# Patient Record
Sex: FEMALE | Race: BLACK OR AFRICAN AMERICAN
[De-identification: names, ages, dates, MRNs, and addresses within clinical notes are randomized per-mention and may not be internally consistent; named-entity substitution may affect disease eponyms.]

---

## 2017-12-10 ENCOUNTER — HOSPITAL ENCOUNTER (INPATIENT)
Dept: HOSPITAL 74 - YASAS | Age: 37
LOS: 4 days | Discharge: HOME | End: 2017-12-14
Attending: INTERNAL MEDICINE | Admitting: INTERNAL MEDICINE
Payer: COMMERCIAL

## 2017-12-10 VITALS — BODY MASS INDEX: 22.3 KG/M2

## 2017-12-10 DIAGNOSIS — F13.10: ICD-10-CM

## 2017-12-10 DIAGNOSIS — F19.24: ICD-10-CM

## 2017-12-10 DIAGNOSIS — F10.230: Primary | ICD-10-CM

## 2017-12-10 DIAGNOSIS — F14.20: ICD-10-CM

## 2017-12-10 DIAGNOSIS — F31.9: ICD-10-CM

## 2017-12-10 LAB
APPEARANCE UR: (no result)
BACTERIA #/AREA URNS HPF: (no result) /HPF
BILIRUB UR STRIP.AUTO-MCNC: NEGATIVE MG/DL
COLOR UR: YELLOW
EPITH CASTS URNS QL MICRO: (no result) /HPF
KETONES UR QL STRIP: NEGATIVE
LEUKOCYTE ESTERASE UR QL STRIP.AUTO: (no result)
MUCOUS THREADS URNS QL MICRO: (no result)
NITRITE UR QL STRIP: NEGATIVE
PH UR: 5 [PH] (ref 5–8)
PROT UR QL STRIP: (no result)
PROT UR QL STRIP: NEGATIVE
RBC # UR STRIP: NEGATIVE /UL
SP GR UR: 1.03 (ref 1–1.03)
UROBILINOGEN UR STRIP-MCNC: NEGATIVE MG/DL (ref 0.2–1)

## 2017-12-10 PROCEDURE — HZ2ZZZZ DETOXIFICATION SERVICES FOR SUBSTANCE ABUSE TREATMENT: ICD-10-PCS | Performed by: INTERNAL MEDICINE

## 2017-12-10 PROCEDURE — G0008 ADMIN INFLUENZA VIRUS VAC: HCPCS

## 2017-12-10 RX ADMIN — ACETAMINOPHEN PRN MG: 325 TABLET ORAL at 22:25

## 2017-12-10 RX ADMIN — NICOTINE SCH: 7 PATCH TRANSDERMAL at 18:42

## 2017-12-10 RX ADMIN — IBUPROFEN PRN MG: 400 TABLET, FILM COATED ORAL at 18:17

## 2017-12-10 RX ADMIN — Medication SCH MG: at 22:24
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
## 2017-12-11 LAB
ALBUMIN SERPL-MCNC: 3.3 G/DL (ref 3.4–5)
ALP SERPL-CCNC: 92 U/L (ref 45–117)
ALT SERPL-CCNC: 42 U/L (ref 12–78)
ANION GAP SERPL CALC-SCNC: 7 MMOL/L (ref 8–16)
AST SERPL-CCNC: 23 U/L (ref 15–37)
BILIRUB SERPL-MCNC: 0.4 MG/DL (ref 0.2–1)
BUN SERPL-MCNC: 11 MG/DL (ref 7–18)
CALCIUM SERPL-MCNC: 8.8 MG/DL (ref 8.5–10.1)
CHLORIDE SERPL-SCNC: 105 MMOL/L (ref 98–107)
CO2 SERPL-SCNC: 27 MMOL/L (ref 21–32)
CREAT SERPL-MCNC: 0.9 MG/DL (ref 0.55–1.02)
DEPRECATED RDW RBC AUTO: 13.8 % (ref 11.6–15.6)
GLUCOSE SERPL-MCNC: 139 MG/DL (ref 74–106)
HCT VFR BLD CALC: 40.8 % (ref 32.4–45.2)
HGB BLD-MCNC: 13.4 GM/DL (ref 10.7–15.3)
MCH RBC QN AUTO: 30.7 PG (ref 25.7–33.7)
MCHC RBC AUTO-ENTMCNC: 32.8 G/DL (ref 32–36)
MCV RBC: 93.4 FL (ref 80–96)
PLATELET # BLD AUTO: 254 K/MM3 (ref 134–434)
PMV BLD: 9.5 FL (ref 7.5–11.1)
POTASSIUM SERPLBLD-SCNC: 4.3 MMOL/L (ref 3.5–5.1)
PROT SERPL-MCNC: 6.7 G/DL (ref 6.4–8.2)
RBC # BLD AUTO: 4.37 M/MM3 (ref 3.6–5.2)
SODIUM SERPL-SCNC: 139 MMOL/L (ref 136–145)
WBC # BLD AUTO: 7.4 K/MM3 (ref 4–10)

## 2017-12-11 RX ADMIN — Medication SCH MG: at 22:31

## 2017-12-11 RX ADMIN — IBUPROFEN PRN MG: 400 TABLET, FILM COATED ORAL at 17:41

## 2017-12-11 RX ADMIN — Medication SCH: at 22:52

## 2017-12-11 RX ADMIN — ACETAMINOPHEN PRN MG: 325 TABLET ORAL at 05:20

## 2017-12-11 RX ADMIN — NICOTINE SCH MG: 7 PATCH TRANSDERMAL at 10:24

## 2017-12-11 RX ADMIN — Medication SCH TAB: at 10:24

## 2017-12-11 RX ADMIN — ACETAMINOPHEN PRN MG: 325 TABLET ORAL at 10:44

## 2017-12-11 NOTE — PN
S CIWA





- CIWA Score


Nausea/Vomitin-No Nausea/No Vomiting


Muscle Tremors: 4-Moderate,w/Arms Extend


Anxiety: 3


Agitation: 3


Paroxysmal Sweats: 3


Orientation: 0-Oriented


Tacttile Disturbances: 0-None


Auditory Disturbances: 0-None


Visual Disturbances: 0-None


Headache: 0-None Present


CIWA-Ar Total Score: 13





BHS Progress Note (SOAP)


Subjective: 





body aches


sweats


irritable 


agitation


shakes


Objective: 





17 11:28


 Vital Signs











Temperature  98.1 F   17 09:56


 


Pulse Rate  89   17 09:56


 


Respiratory Rate  18   17 09:56


 


Blood Pressure  124/56   17 09:56


 


O2 Sat by Pulse Oximetry (%)      








 Laboratory Tests











  12/10/17 12/11/17 12/11/17





  18:00 07:00 07:00


 


WBC   7.4 


 


RBC   4.37 


 


Hgb   13.4 


 


Hct   40.8 


 


MCV   93.4 


 


MCH   30.7 


 


MCHC   32.8 


 


RDW   13.8 


 


Plt Count   254 


 


MPV   9.5 


 


Sodium    139


 


Potassium    4.3


 


Chloride    105


 


Carbon Dioxide    27


 


Anion Gap    7 L


 


BUN    11


 


Creatinine    0.9


 


Creat Clearance w eGFR    > 60


 


Random Glucose    139 H


 


Calcium    8.8


 


Total Bilirubin    0.4


 


AST    23


 


ALT    42


 


Alkaline Phosphatase    92


 


Total Protein    6.7


 


Albumin    3.3 L


 


Urine Color  Yellow  


 


Urine Appearance  Cloudy  


 


Urine pH  5.0  


 


Ur Specific Gravity  1.026  


 


Urine Protein  Negative  


 


Urine Glucose (UA)  1+ H  


 


Urine Ketones  Negative  


 


Urine Blood  Negative  


 


Urine Nitrite  Negative  


 


Urine Bilirubin  Negative  


 


Urine Urobilinogen  Negative  


 


Ur Leukocyte Esterase  Negative  


 


Urine WBC (Auto)  5  


 


Urine RBC (Auto)  2  


 


Ur Epithelial Cells  Many  


 


Urine Bacteria  Few  


 


Urine Mucus  Few  








aaox3


ambulating


no acute distress


Assessment: 





17 11:29


withdrawal sx


Plan: 





continue detox


increase fluids


lidocaine patch


motrin/tylenol prn

## 2017-12-11 NOTE — EKG
Test Reason : 

Blood Pressure : ***/*** mmHG

Vent. Rate : 085 BPM     Atrial Rate : 085 BPM

   P-R Int : 126 ms          QRS Dur : 084 ms

    QT Int : 368 ms       P-R-T Axes : 078 064 054 degrees

   QTc Int : 437 ms

 

SINUS RHYTHM WITH OCCASIONAL PREMATURE VENTRICULAR COMPLEXES

OTHERWISE NORMAL ECG

NO PREVIOUS ECGS AVAILABLE

Confirmed by MIRZA DA SILVA, KAMARI ([id number]) on 12/11/2017 3:13:41 PM

 

Referred By:             Confirmed By:KAMARI BLUE MD

## 2017-12-11 NOTE — CONSULT
[facility] Psychiatric Consult





- Data


Date of interview: 12/11/17


Admission source: [facility]


Identifying data: This is 37 years old female with history of Bipolar Disorder, 

history of Psychiatric hospitalizations, intoxicated with:  Alcohol, Cocaine 

and Benzodiazepins


Substance Abuse History: Substances Abused.  ** Alcohol.  Route: Oral.  

Frequency: Daily.  Amount used: Vodka  1 pint.  Age of first use: 13.  Date of 

Last Use: 12/09/17.  ** Crack.  Route: Smoking.  Frequency: Daily.  Amount used

: $100.00.  Age of first use: 20.  Date of Last Use: 12/09/17


Medical History: Denies significant medical issues


Psychiatric History: Patoent rteports history of Bipolar disorder, history of 

psychiatric jhospoitalizations with most brecent time on [phone] at at [facility] fopr safety. Patioent reports being on Trileptal in the past, 

currently is no taking any psychiatric medicatoins prior to admission


Physical/Sexual Abuse/Trauma History: Denies


Additional Comment: Observation.  Detox Unit Care Protocol





Mental Status Exam





- Mental Status Exam


Alert and Oriented to: Person


Cognitive Function: Fair


Patient Appearance: Well Groomed


Mood: Apprehensive


Affect: Mood Congruent


Patient Behavior: Cooperative


Speech Pattern: Appropriate


Voice Loudness: Normal


Thought Process: Circumstantial, Goal Oriented


Thought Disorder: Being Controlled


Hallucinations: Denies


Suicidal Ideation: Denies


Homicidal Ideation: Denies


Insight/Judgement: Fair


Sleep: Difficulty falling asleep


Appetite: Fair


Muscle strength/Tone: Normal


Gait/Station: Normal


Additional Comments: Observation.  Detox Unit Care Protocol





Psychiatric Findings





- Problem List (Axis 1, 2,3)


(1) Bipolar disorder


Current Visit: Yes   Status: Suspected   





(2) Benzodiazepine abuse


Current Visit: Yes   Status: Acute   





(3) Drug-induced mood disorder


Current Visit: Yes   Status: Acute   





(4) Alcohol dependence with uncomplicated withdrawal


Current Visit: Yes   Status: Acute   





(5) Cocaine dependence, uncomplicated


Current Visit: Yes   Status: Acute   





- Initial Treatment Plan


Initial Treatment Plan: Observation.  Detox Unit Care Protocol

## 2017-12-12 RX ADMIN — NICOTINE SCH MG: 7 PATCH TRANSDERMAL at 10:54

## 2017-12-12 RX ADMIN — Medication SCH TAB: at 10:52

## 2017-12-12 RX ADMIN — Medication SCH MG: at 22:34

## 2017-12-12 RX ADMIN — IBUPROFEN PRN MG: 400 TABLET, FILM COATED ORAL at 22:34

## 2017-12-12 RX ADMIN — ANALGESIC BALM SCH APPLIC: 1.74; 4.06 OINTMENT TOPICAL at 10:52

## 2017-12-12 RX ADMIN — Medication SCH EACH: at 22:37

## 2017-12-12 RX ADMIN — LIDOCAINE SCH PATCH: 50 PATCH TOPICAL at 10:55

## 2017-12-12 NOTE — PN
[facility] CIWA





- CIWA Score


Nausea/Vomitin-No Nausea/No Vomiting


Muscle Tremors: 3


Anxiety: 2


Agitation: 3


Paroxysmal Sweats: 3


Orientation: 0-Oriented


Tacttile Disturbances: 0-None


Auditory Disturbances: 0-None


Visual Disturbances: 0-None


Headache: 0-None Present


CIWA-Ar Total Score: 11





BHS Progress Note (SOAP)


Subjective: 





agitation


restless


sweats





Objective: 





17 11:04


 Vital Signs











Temperature  98.1 F   17 09:51


 


Pulse Rate  85   17 09:51


 


Respiratory Rate  18   17 09:51


 


Blood Pressure  136/90   17 09:51


 


O2 Sat by Pulse Oximetry (%)      








 Laboratory Tests











  12/10/17 12/11/17 12/11/17





  18:00 07:00 07:00


 


WBC   7.4 


 


RBC   4.37 


 


Hgb   13.4 


 


Hct   40.8 


 


MCV   93.4 


 


MCH   30.7 


 


MCHC   32.8 


 


RDW   13.8 


 


Plt Count   254 


 


MPV   9.5 


 


Sodium    139


 


Potassium    4.3


 


Chloride    105


 


Carbon Dioxide    27


 


Anion Gap    7 L


 


BUN    11


 


Creatinine    0.9


 


Creat Clearance w eGFR    > 60


 


Random Glucose    139 H


 


Calcium    8.8


 


Total Bilirubin    0.4


 


AST    23


 


ALT    42


 


Alkaline Phosphatase    92


 


Total Protein    6.7


 


Albumin    3.3 L


 


Urine Color  Yellow  


 


Urine Appearance  Cloudy  


 


Urine pH  5.0  


 


Ur Specific Gravity  1.026  


 


Urine Protein  Negative  


 


Urine Glucose (UA)  1+ H  


 


Urine Ketones  Negative  


 


Urine Blood  Negative  


 


Urine Nitrite  Negative  


 


Urine Bilirubin  Negative  


 


Urine Urobilinogen  Negative  


 


Ur Leukocyte Esterase  Negative  


 


Urine WBC (Auto)  5  


 


Urine RBC (Auto)  2  


 


Ur Epithelial Cells  Many  


 


Urine Bacteria  Few  


 


Urine Mucus  Few  


 


RPR Titer   


 


HIV 1&2 Antibody Screen   


 


HIV P24 Antigen   














  17





  07:00 07:00


 


WBC  


 


RBC  


 


Hgb  


 


Hct  


 


MCV  


 


MCH  


 


MCHC  


 


RDW  


 


Plt Count  


 


MPV  


 


Sodium  


 


Potassium  


 


Chloride  


 


Carbon Dioxide  


 


Anion Gap  


 


BUN  


 


Creatinine  


 


Creat Clearance w eGFR  


 


Random Glucose  


 


Calcium  


 


Total Bilirubin  


 


AST  


 


ALT  


 


Alkaline Phosphatase  


 


Total Protein  


 


Albumin  


 


Urine Color  


 


Urine Appearance  


 


Urine pH  


 


Ur Specific Gravity  


 


Urine Protein  


 


Urine Glucose (UA)  


 


Urine Ketones  


 


Urine Blood  


 


Urine Nitrite  


 


Urine Bilirubin  


 


Urine Urobilinogen  


 


Ur Leukocyte Esterase  


 


Urine WBC (Auto)  


 


Urine RBC (Auto)  


 


Ur Epithelial Cells  


 


Urine Bacteria  


 


Urine Mucus  


 


RPR Titer  Nonreactive 


 


HIV 1&2 Antibody Screen   Negative


 


HIV P24 Antigen   Negative








aaox3


ambulating


no acute distress


Assessment: 





17 11:05


withdrawal sx


Plan: 





continue detox


increase fluids

## 2017-12-13 RX ADMIN — ANALGESIC BALM SCH APPLIC: 1.74; 4.06 OINTMENT TOPICAL at 10:47

## 2017-12-13 RX ADMIN — Medication SCH MG: at 22:48

## 2017-12-13 RX ADMIN — LIDOCAINE SCH PATCH: 50 PATCH TOPICAL at 10:50

## 2017-12-13 RX ADMIN — Medication SCH TAB: at 10:48

## 2017-12-13 RX ADMIN — IBUPROFEN PRN MG: 400 TABLET, FILM COATED ORAL at 06:16

## 2017-12-13 RX ADMIN — Medication SCH EACH: at 22:48

## 2017-12-13 RX ADMIN — NICOTINE SCH MG: 7 PATCH TRANSDERMAL at 10:50
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
## 2017-12-14 VITALS — TEMPERATURE: 98 F | HEART RATE: 90 BPM | SYSTOLIC BLOOD PRESSURE: 119 MMHG | DIASTOLIC BLOOD PRESSURE: 51 MMHG

## 2017-12-14 RX ADMIN — NICOTINE SCH MG: 7 PATCH TRANSDERMAL at 10:39

## 2017-12-14 RX ADMIN — LIDOCAINE SCH PATCH: 50 PATCH TOPICAL at 10:40

## 2017-12-14 RX ADMIN — Medication SCH TAB: at 10:38

## 2017-12-14 RX ADMIN — ANALGESIC BALM SCH APPLIC: 1.74; 4.06 OINTMENT TOPICAL at 10:39

## 2017-12-14 NOTE — DS
[facility] Detox Discharge Summary


Admission Date: 


12/10/17





Discharge Date: 12/14/17





- History


Present History: Alcohol Dependence, Cocaine Dependence


Additional Comments: 





medically stabel for discharge today, may stay until snow stopa nd medication 

finishses


Pertinent Past History: 





anxiety, depression, insomnia, nicotine dependence, benzodiazepine abuse





- Physical Exam Results


Vital Signs: 


 Vital Signs











Temperature  97.7 F   12/14/17 06:25


 


Pulse Rate  77   12/14/17 06:25


 


Respiratory Rate  18   12/14/17 06:25


 


Blood Pressure  112/67   12/14/17 06:25


 


O2 Sat by Pulse Oximetry (%)      








 Laboratory Tests











  12/10/17 12/11/17 12/11/17





  18:00 07:00 07:00


 


WBC   7.4 


 


RBC   4.37 


 


Hgb   13.4 


 


Hct   40.8 


 


MCV   93.4 


 


MCH   30.7 


 


MCHC   32.8 


 


RDW   13.8 


 


Plt Count   254 


 


MPV   9.5 


 


Sodium    139


 


Potassium    4.3


 


Chloride    105


 


Carbon Dioxide    27


 


Anion Gap    7 L


 


BUN    11


 


Creatinine    0.9


 


Creat Clearance w eGFR    > 60


 


Random Glucose    139 H


 


Calcium    8.8


 


Total Bilirubin    0.4


 


AST    23


 


ALT    42


 


Alkaline Phosphatase    92


 


Total Protein    6.7


 


Albumin    3.3 L


 


Urine Color  Yellow  


 


Urine Appearance  Cloudy  


 


Urine pH  5.0  


 


Ur Specific Gravity  1.026  


 


Urine Protein  Negative  


 


Urine Glucose (UA)  1+ H  


 


Urine Ketones  Negative  


 


Urine Blood  Negative  


 


Urine Nitrite  Negative  


 


Urine Bilirubin  Negative  


 


Urine Urobilinogen  Negative  


 


Ur Leukocyte Esterase  Negative  


 


Urine WBC (Auto)  5  


 


Urine RBC (Auto)  2  


 


Ur Epithelial Cells  Many  


 


Urine Bacteria  Few  


 


Urine Mucus  Few  


 


RPR Titer   


 


HIV 1&2 Antibody Screen   


 


HIV P24 Antigen   














  12/11/17 12/11/17





  07:00 07:00


 


WBC  


 


RBC  


 


Hgb  


 


Hct  


 


MCV  


 


MCH  


 


MCHC  


 


RDW  


 


Plt Count  


 


MPV  


 


Sodium  


 


Potassium  


 


Chloride  


 


Carbon Dioxide  


 


Anion Gap  


 


BUN  


 


Creatinine  


 


Creat Clearance w eGFR  


 


Random Glucose  


 


Calcium  


 


Total Bilirubin  


 


AST  


 


ALT  


 


Alkaline Phosphatase  


 


Total Protein  


 


Albumin  


 


Urine Color  


 


Urine Appearance  


 


Urine pH  


 


Ur Specific Gravity  


 


Urine Protein  


 


Urine Glucose (UA)  


 


Urine Ketones  


 


Urine Blood  


 


Urine Nitrite  


 


Urine Bilirubin  


 


Urine Urobilinogen  


 


Ur Leukocyte Esterase  


 


Urine WBC (Auto)  


 


Urine RBC (Auto)  


 


Ur Epithelial Cells  


 


Urine Bacteria  


 


Urine Mucus  


 


RPR Titer  Nonreactive 


 


HIV 1&2 Antibody Screen   Negative


 


HIV P24 Antigen   Negative








elevated glucose, low al - labs reviewed with patient - to follow up with PCP 

when discharged fro medical care and to repeat labwork, d/c illicit and alcohol 

use. 


Pertinent Admission Physical Exam Findings: 





withdrawal sx





- Treatment


Hospital Course: Detox Protocol Followed, Detoxed Safely, Responded well, 

Discharged Condition Good, Rehab Referral Accepted


Patient has Accepted a Rehab Referral to: yes





- Medication


Discharge Medications: 


Ambulatory Orders





NK [No Known Home Medication]  12/10/17 











- Diagnosis


(1) Drug-induced mood disorder


Current Visit: Yes   Status: Acute   





(2) Alcohol dependence with uncomplicated withdrawal


Current Visit: Yes   Status: Chronic   





(3) Benzodiazepine abuse


Current Visit: Yes   Status: Chronic   





(4) Cocaine dependence, uncomplicated


Current Visit: Yes   Status: Chronic   





(5) Bipolar disorder


Current Visit: Yes   Status: Suspected   





- AMA


Did Patient Leave Against Medical Advice: No

## 2017-12-14 NOTE — PN
[facility] Progress Note


Note: 





rib xray neg for fx but slight abnomrality - patient given copy of xary and 

informed to follow up with PCP when discharged, most liekly pleural thickening.

## 2021-05-28 ENCOUNTER — HOSPITAL ENCOUNTER (INPATIENT)
Dept: HOSPITAL 74 - YASAS | Age: 41
LOS: 5 days | Discharge: OTHER INSTITUTION | End: 2021-06-02
Attending: ALLERGY & IMMUNOLOGY | Admitting: ALLERGY & IMMUNOLOGY
Payer: COMMERCIAL

## 2021-05-28 VITALS — BODY MASS INDEX: 20.2 KG/M2

## 2021-05-28 DIAGNOSIS — F19.24: ICD-10-CM

## 2021-05-28 DIAGNOSIS — F17.210: ICD-10-CM

## 2021-05-28 DIAGNOSIS — F14.20: ICD-10-CM

## 2021-05-28 DIAGNOSIS — F31.9: ICD-10-CM

## 2021-05-28 DIAGNOSIS — F10.230: Primary | ICD-10-CM

## 2021-05-28 DIAGNOSIS — Z59.0: ICD-10-CM

## 2021-05-28 DIAGNOSIS — F13.20: ICD-10-CM

## 2021-05-28 PROCEDURE — HZ2ZZZZ DETOXIFICATION SERVICES FOR SUBSTANCE ABUSE TREATMENT: ICD-10-PCS | Performed by: ALLERGY & IMMUNOLOGY

## 2021-05-28 PROCEDURE — C9803 HOPD COVID-19 SPEC COLLECT: HCPCS

## 2021-05-28 PROCEDURE — U0003 INFECTIOUS AGENT DETECTION BY NUCLEIC ACID (DNA OR RNA); SEVERE ACUTE RESPIRATORY SYNDROME CORONAVIRUS 2 (SARS-COV-2) (CORONAVIRUS DISEASE [COVID-19]), AMPLIFIED PROBE TECHNIQUE, MAKING USE OF HIGH THROUGHPUT TECHNOLOGIES AS DESCRIBED BY CMS-2020-01-R: HCPCS

## 2021-05-28 PROCEDURE — U0005 INFEC AGEN DETEC AMPLI PROBE: HCPCS

## 2021-05-28 SDOH — ECONOMIC STABILITY - HOUSING INSECURITY: HOMELESSNESS: Z59.0

## 2021-05-29 LAB
ALBUMIN SERPL-MCNC: 2.9 G/DL (ref 3.4–5)
ALP SERPL-CCNC: 144 U/L (ref 45–117)
ALT SERPL-CCNC: 56 U/L (ref 13–61)
ANION GAP SERPL CALC-SCNC: 5 MMOL/L (ref 8–16)
AST SERPL-CCNC: 57 U/L (ref 15–37)
BILIRUB SERPL-MCNC: 0.5 MG/DL (ref 0.2–1)
BUN SERPL-MCNC: 8.2 MG/DL (ref 7–18)
CALCIUM SERPL-MCNC: 8.2 MG/DL (ref 8.5–10.1)
CHLORIDE SERPL-SCNC: 102 MMOL/L (ref 98–107)
CO2 SERPL-SCNC: 33 MMOL/L (ref 21–32)
CREAT SERPL-MCNC: 0.9 MG/DL (ref 0.55–1.3)
DEPRECATED RDW RBC AUTO: 14.2 % (ref 11.6–15.6)
GLUCOSE SERPL-MCNC: 102 MG/DL (ref 74–106)
HCT VFR BLD CALC: 36.5 % (ref 32.4–45.2)
HGB BLD-MCNC: 12.4 GM/DL (ref 10.7–15.3)
MCH RBC QN AUTO: 32.9 PG (ref 25.7–33.7)
MCHC RBC AUTO-ENTMCNC: 33.9 G/DL (ref 32–36)
MCV RBC: 97 FL (ref 80–96)
PLATELET # BLD AUTO: 207 K/MM3 (ref 134–434)
PMV BLD: 9 FL (ref 7.5–11.1)
PROT SERPL-MCNC: 6.3 G/DL (ref 6.4–8.2)
RBC # BLD AUTO: 3.77 M/MM3 (ref 3.6–5.2)
SODIUM SERPL-SCNC: 140 MMOL/L (ref 136–145)
WBC # BLD AUTO: 5.1 K/MM3 (ref 4–10)

## 2021-05-29 RX ADMIN — HYDROXYZINE PAMOATE SCH MG: 25 CAPSULE ORAL at 18:19

## 2021-05-29 RX ADMIN — NICOTINE SCH: 14 PATCH, EXTENDED RELEASE TRANSDERMAL at 10:32

## 2021-05-29 RX ADMIN — HYDROXYZINE PAMOATE SCH: 25 CAPSULE ORAL at 07:49

## 2021-05-29 RX ADMIN — HYDROXYZINE PAMOATE SCH: 25 CAPSULE ORAL at 10:32

## 2021-05-29 RX ADMIN — HYDROXYZINE PAMOATE SCH: 25 CAPSULE ORAL at 15:30

## 2021-05-29 RX ADMIN — Medication SCH MG: at 22:36

## 2021-05-29 RX ADMIN — HYDROXYZINE PAMOATE SCH MG: 25 CAPSULE ORAL at 22:36

## 2021-05-29 RX ADMIN — Medication SCH TAB: at 10:32

## 2021-05-30 RX ADMIN — HYDROXYZINE PAMOATE SCH: 25 CAPSULE ORAL at 07:01

## 2021-05-30 RX ADMIN — NICOTINE SCH: 14 PATCH, EXTENDED RELEASE TRANSDERMAL at 10:49

## 2021-05-30 RX ADMIN — HYDROXYZINE PAMOATE SCH: 25 CAPSULE ORAL at 15:57

## 2021-05-30 RX ADMIN — HYDROXYZINE PAMOATE SCH MG: 25 CAPSULE ORAL at 10:48

## 2021-05-30 RX ADMIN — Medication SCH MG: at 22:42

## 2021-05-30 RX ADMIN — Medication SCH TAB: at 10:49

## 2021-05-30 RX ADMIN — HYDROXYZINE PAMOATE SCH MG: 25 CAPSULE ORAL at 22:41

## 2021-05-30 RX ADMIN — Medication SCH MG: at 22:41

## 2021-05-30 RX ADMIN — HYDROXYZINE PAMOATE SCH: 25 CAPSULE ORAL at 18:16

## 2021-05-31 RX ADMIN — HYDROXYZINE PAMOATE SCH: 25 CAPSULE ORAL at 11:14

## 2021-05-31 RX ADMIN — NICOTINE SCH: 14 PATCH, EXTENDED RELEASE TRANSDERMAL at 11:13

## 2021-05-31 RX ADMIN — HYDROXYZINE PAMOATE SCH: 25 CAPSULE ORAL at 18:04

## 2021-05-31 RX ADMIN — HYDROXYZINE PAMOATE SCH: 25 CAPSULE ORAL at 13:03

## 2021-05-31 RX ADMIN — Medication SCH: at 11:14

## 2021-05-31 RX ADMIN — Medication SCH MG: at 22:51

## 2021-05-31 RX ADMIN — HYDROXYZINE PAMOATE SCH: 25 CAPSULE ORAL at 06:29

## 2021-05-31 RX ADMIN — HYDROXYZINE PAMOATE SCH MG: 25 CAPSULE ORAL at 22:50

## 2021-06-01 RX ADMIN — HYDROXYZINE PAMOATE SCH: 25 CAPSULE ORAL at 23:09

## 2021-06-01 RX ADMIN — HYDROXYZINE PAMOATE SCH: 25 CAPSULE ORAL at 06:24

## 2021-06-01 RX ADMIN — HYDROXYZINE PAMOATE SCH MG: 25 CAPSULE ORAL at 17:53

## 2021-06-01 RX ADMIN — Medication SCH: at 23:09

## 2021-06-01 RX ADMIN — NICOTINE SCH: 14 PATCH, EXTENDED RELEASE TRANSDERMAL at 09:37

## 2021-06-01 RX ADMIN — Medication SCH TAB: at 09:39

## 2021-06-01 RX ADMIN — HYDROXYZINE PAMOATE SCH: 25 CAPSULE ORAL at 14:06

## 2021-06-01 RX ADMIN — HYDROXYZINE PAMOATE SCH: 25 CAPSULE ORAL at 09:37

## 2021-06-02 ENCOUNTER — HOSPITAL ENCOUNTER (INPATIENT)
Dept: HOSPITAL 74 - YASAS | Age: 41
LOS: 13 days | Discharge: HOME | DRG: 772 | End: 2021-06-15
Attending: ALLERGY & IMMUNOLOGY | Admitting: ALLERGY & IMMUNOLOGY
Payer: COMMERCIAL

## 2021-06-02 VITALS — SYSTOLIC BLOOD PRESSURE: 101 MMHG | DIASTOLIC BLOOD PRESSURE: 41 MMHG | HEART RATE: 79 BPM | TEMPERATURE: 96.9 F

## 2021-06-02 DIAGNOSIS — Z62.810: ICD-10-CM

## 2021-06-02 DIAGNOSIS — F10.20: Primary | ICD-10-CM

## 2021-06-02 DIAGNOSIS — F31.9: ICD-10-CM

## 2021-06-02 DIAGNOSIS — F14.20: ICD-10-CM

## 2021-06-02 DIAGNOSIS — F39: ICD-10-CM

## 2021-06-02 DIAGNOSIS — Z59.0: ICD-10-CM

## 2021-06-02 DIAGNOSIS — F17.210: ICD-10-CM

## 2021-06-02 DIAGNOSIS — F13.20: ICD-10-CM

## 2021-06-02 DIAGNOSIS — F19.24: ICD-10-CM

## 2021-06-02 PROCEDURE — U0005 INFEC AGEN DETEC AMPLI PROBE: HCPCS

## 2021-06-02 PROCEDURE — U0003 INFECTIOUS AGENT DETECTION BY NUCLEIC ACID (DNA OR RNA); SEVERE ACUTE RESPIRATORY SYNDROME CORONAVIRUS 2 (SARS-COV-2) (CORONAVIRUS DISEASE [COVID-19]), AMPLIFIED PROBE TECHNIQUE, MAKING USE OF HIGH THROUGHPUT TECHNOLOGIES AS DESCRIBED BY CMS-2020-01-R: HCPCS

## 2021-06-02 PROCEDURE — C9803 HOPD COVID-19 SPEC COLLECT: HCPCS

## 2021-06-02 PROCEDURE — HZ42ZZZ GROUP COUNSELING FOR SUBSTANCE ABUSE TREATMENT, COGNITIVE-BEHAVIORAL: ICD-10-PCS | Performed by: ALLERGY & IMMUNOLOGY

## 2021-06-02 RX ADMIN — HYDROXYZINE PAMOATE SCH: 25 CAPSULE ORAL at 09:54

## 2021-06-02 RX ADMIN — NICOTINE SCH MG: 14 PATCH, EXTENDED RELEASE TRANSDERMAL at 09:54

## 2021-06-02 RX ADMIN — Medication SCH: at 09:54

## 2021-06-02 RX ADMIN — HYDROXYZINE PAMOATE SCH: 25 CAPSULE ORAL at 07:25

## 2021-06-02 RX ADMIN — Medication SCH MG: at 21:27

## 2021-06-02 RX ADMIN — Medication SCH: at 15:04

## 2021-06-02 RX ADMIN — NICOTINE SCH: 21 PATCH TRANSDERMAL at 15:04

## 2021-06-02 SDOH — ECONOMIC STABILITY - HOUSING INSECURITY: HOMELESSNESS: Z59.0

## 2021-06-03 RX ADMIN — Medication SCH TAB: at 10:38

## 2021-06-03 RX ADMIN — Medication SCH MG: at 21:15

## 2021-06-03 RX ADMIN — OXCARBAZEPINE SCH MG: 150 TABLET, FILM COATED ORAL at 21:15

## 2021-06-03 RX ADMIN — HYDROXYZINE PAMOATE PRN MG: 25 CAPSULE ORAL at 21:15

## 2021-06-03 RX ADMIN — NICOTINE SCH MG: 21 PATCH TRANSDERMAL at 10:38

## 2021-06-04 RX ADMIN — OXCARBAZEPINE SCH MG: 150 TABLET, FILM COATED ORAL at 10:10

## 2021-06-04 RX ADMIN — Medication SCH MG: at 21:41

## 2021-06-04 RX ADMIN — NICOTINE SCH MG: 21 PATCH TRANSDERMAL at 10:10

## 2021-06-04 RX ADMIN — HYDROXYZINE PAMOATE PRN MG: 25 CAPSULE ORAL at 19:25

## 2021-06-04 RX ADMIN — OXCARBAZEPINE SCH MG: 150 TABLET, FILM COATED ORAL at 21:41

## 2021-06-04 RX ADMIN — Medication SCH TAB: at 10:10

## 2021-06-05 RX ADMIN — Medication SCH TAB: at 10:24

## 2021-06-05 RX ADMIN — NICOTINE SCH MG: 21 PATCH TRANSDERMAL at 10:23

## 2021-06-05 RX ADMIN — Medication SCH MG: at 21:19

## 2021-06-05 RX ADMIN — OXCARBAZEPINE SCH MG: 150 TABLET, FILM COATED ORAL at 21:19

## 2021-06-05 RX ADMIN — OXCARBAZEPINE SCH MG: 150 TABLET, FILM COATED ORAL at 10:24

## 2021-06-06 RX ADMIN — OXCARBAZEPINE SCH MG: 150 TABLET, FILM COATED ORAL at 21:33

## 2021-06-06 RX ADMIN — Medication SCH MG: at 21:33

## 2021-06-06 RX ADMIN — OXCARBAZEPINE SCH MG: 150 TABLET, FILM COATED ORAL at 09:29

## 2021-06-06 RX ADMIN — Medication SCH MG: at 21:34

## 2021-06-06 RX ADMIN — Medication SCH TAB: at 09:28

## 2021-06-06 RX ADMIN — NICOTINE SCH MG: 21 PATCH TRANSDERMAL at 09:28

## 2021-06-07 RX ADMIN — Medication SCH MG: at 21:16

## 2021-06-07 RX ADMIN — OXCARBAZEPINE SCH MG: 150 TABLET, FILM COATED ORAL at 21:17

## 2021-06-07 RX ADMIN — OXCARBAZEPINE SCH MG: 150 TABLET, FILM COATED ORAL at 10:13

## 2021-06-07 RX ADMIN — Medication SCH TAB: at 10:13

## 2021-06-07 RX ADMIN — NICOTINE SCH MG: 21 PATCH TRANSDERMAL at 10:13

## 2021-06-08 RX ADMIN — Medication SCH TAB: at 09:55

## 2021-06-08 RX ADMIN — OXCARBAZEPINE SCH MG: 150 TABLET, FILM COATED ORAL at 21:28

## 2021-06-08 RX ADMIN — Medication SCH MG: at 21:28

## 2021-06-08 RX ADMIN — NICOTINE SCH MG: 21 PATCH TRANSDERMAL at 09:55

## 2021-06-08 RX ADMIN — OXCARBAZEPINE SCH MG: 150 TABLET, FILM COATED ORAL at 09:55

## 2021-06-09 LAB
APPEARANCE UR: CLEAR
BILIRUB UR STRIP.AUTO-MCNC: NEGATIVE MG/DL
COLOR UR: YELLOW
KETONES UR QL STRIP: NEGATIVE
LEUKOCYTE ESTERASE UR QL STRIP.AUTO: NEGATIVE
NITRITE UR QL STRIP: NEGATIVE
PH UR: 6 [PH] (ref 5–8)
PROT UR QL STRIP: NEGATIVE
PROT UR QL STRIP: NEGATIVE
SP GR UR: 1.02 (ref 1.01–1.03)
UROBILINOGEN UR STRIP-MCNC: 0.2 MG/DL (ref 0.2–1)

## 2021-06-09 RX ADMIN — OXCARBAZEPINE SCH MG: 150 TABLET, FILM COATED ORAL at 10:33

## 2021-06-09 RX ADMIN — OXCARBAZEPINE SCH MG: 150 TABLET, FILM COATED ORAL at 21:21

## 2021-06-09 RX ADMIN — Medication SCH MG: at 21:21

## 2021-06-09 RX ADMIN — NICOTINE SCH MG: 21 PATCH TRANSDERMAL at 10:33

## 2021-06-09 RX ADMIN — Medication SCH TAB: at 10:33

## 2021-06-10 LAB — HIV 1+2 AB+HIV1 P24 AG SERPL QL IA: NEGATIVE

## 2021-06-10 RX ADMIN — Medication SCH MG: at 21:36

## 2021-06-10 RX ADMIN — Medication SCH TAB: at 10:47

## 2021-06-10 RX ADMIN — NICOTINE SCH MG: 21 PATCH TRANSDERMAL at 10:47

## 2021-06-10 RX ADMIN — OXCARBAZEPINE SCH MG: 150 TABLET, FILM COATED ORAL at 21:35

## 2021-06-10 RX ADMIN — OXCARBAZEPINE SCH MG: 150 TABLET, FILM COATED ORAL at 10:47

## 2021-06-11 RX ADMIN — NICOTINE SCH MG: 21 PATCH TRANSDERMAL at 09:59

## 2021-06-11 RX ADMIN — Medication SCH TAB: at 09:59

## 2021-06-11 RX ADMIN — Medication SCH MG: at 21:10

## 2021-06-11 RX ADMIN — HYDROXYZINE PAMOATE PRN MG: 25 CAPSULE ORAL at 06:22

## 2021-06-11 RX ADMIN — OXCARBAZEPINE SCH MG: 150 TABLET, FILM COATED ORAL at 21:10

## 2021-06-11 RX ADMIN — OXCARBAZEPINE SCH MG: 150 TABLET, FILM COATED ORAL at 09:59

## 2021-06-12 RX ADMIN — OXCARBAZEPINE SCH MG: 150 TABLET, FILM COATED ORAL at 22:13

## 2021-06-12 RX ADMIN — Medication SCH MG: at 22:13

## 2021-06-12 RX ADMIN — OXCARBAZEPINE SCH MG: 150 TABLET, FILM COATED ORAL at 09:50

## 2021-06-12 RX ADMIN — NICOTINE SCH MG: 21 PATCH TRANSDERMAL at 09:50

## 2021-06-12 RX ADMIN — Medication SCH TAB: at 09:50

## 2021-06-13 RX ADMIN — Medication SCH MG: at 21:07

## 2021-06-13 RX ADMIN — OXCARBAZEPINE SCH MG: 150 TABLET, FILM COATED ORAL at 09:34

## 2021-06-13 RX ADMIN — NICOTINE SCH MG: 21 PATCH TRANSDERMAL at 09:34

## 2021-06-13 RX ADMIN — OXCARBAZEPINE SCH MG: 150 TABLET, FILM COATED ORAL at 21:07

## 2021-06-13 RX ADMIN — Medication SCH TAB: at 09:33

## 2021-06-14 RX ADMIN — NICOTINE SCH MG: 21 PATCH TRANSDERMAL at 10:30

## 2021-06-14 RX ADMIN — OXCARBAZEPINE SCH MG: 150 TABLET, FILM COATED ORAL at 21:56

## 2021-06-14 RX ADMIN — Medication SCH TAB: at 10:31

## 2021-06-14 RX ADMIN — Medication SCH MG: at 21:56

## 2021-06-14 RX ADMIN — OXCARBAZEPINE SCH MG: 150 TABLET, FILM COATED ORAL at 10:31

## 2021-06-15 VITALS — HEART RATE: 72 BPM | TEMPERATURE: 98.2 F | DIASTOLIC BLOOD PRESSURE: 59 MMHG | SYSTOLIC BLOOD PRESSURE: 104 MMHG

## 2021-06-15 RX ADMIN — NICOTINE SCH: 21 PATCH TRANSDERMAL at 09:03

## 2021-06-15 RX ADMIN — Medication SCH TAB: at 09:04

## 2021-06-15 RX ADMIN — OXCARBAZEPINE SCH MG: 150 TABLET, FILM COATED ORAL at 09:04
